# Patient Record
Sex: MALE | Race: WHITE | Employment: FULL TIME | URBAN - METROPOLITAN AREA
[De-identification: names, ages, dates, MRNs, and addresses within clinical notes are randomized per-mention and may not be internally consistent; named-entity substitution may affect disease eponyms.]

---

## 2019-07-11 ENCOUNTER — HOSPITAL ENCOUNTER (INPATIENT)
Facility: CLINIC | Age: 49
LOS: 1 days | Discharge: HOME | DRG: 394 | End: 2019-07-12
Attending: EMERGENCY MEDICINE | Admitting: INTERNAL MEDICINE
Payer: COMMERCIAL

## 2019-07-11 DIAGNOSIS — K62.5 RECTAL HEMORRHAGE: ICD-10-CM

## 2019-07-11 DIAGNOSIS — R55 SYNCOPE, UNSPECIFIED SYNCOPE TYPE: ICD-10-CM

## 2019-07-11 LAB
ABO + RH BLD: NORMAL
ABO + RH BLD: NORMAL
ANION GAP SERPL CALCULATED.3IONS-SCNC: 7 MMOL/L (ref 3–14)
APTT PPP: 24 SEC (ref 22–37)
BASOPHILS # BLD AUTO: 0 10E9/L (ref 0–0.2)
BASOPHILS NFR BLD AUTO: 0.4 %
BLD GP AB SCN SERPL QL: NORMAL
BLOOD BANK CMNT PATIENT-IMP: NORMAL
BUN SERPL-MCNC: 15 MG/DL (ref 7–30)
CALCIUM SERPL-MCNC: 8.3 MG/DL (ref 8.5–10.1)
CHLORIDE SERPL-SCNC: 109 MMOL/L (ref 94–109)
CO2 SERPL-SCNC: 26 MMOL/L (ref 20–32)
COLONOSCOPY: NORMAL
CREAT SERPL-MCNC: 1.04 MG/DL (ref 0.66–1.25)
DIFFERENTIAL METHOD BLD: ABNORMAL
EOSINOPHIL # BLD AUTO: 0 10E9/L (ref 0–0.7)
EOSINOPHIL NFR BLD AUTO: 0.4 %
ERYTHROCYTE [DISTWIDTH] IN BLOOD BY AUTOMATED COUNT: 12.2 % (ref 10–15)
GFR SERPL CREATININE-BSD FRML MDRD: 83 ML/MIN/{1.73_M2}
GLUCOSE SERPL-MCNC: 146 MG/DL (ref 70–99)
HCT VFR BLD AUTO: 39.2 % (ref 40–53)
HGB BLD-MCNC: 11.1 G/DL (ref 13.3–17.7)
HGB BLD-MCNC: 13.2 G/DL (ref 13.3–17.7)
IMM GRANULOCYTES # BLD: 0 10E9/L (ref 0–0.4)
IMM GRANULOCYTES NFR BLD: 0.1 %
INR PPP: 1.02 (ref 0.86–1.14)
INTERPRETATION ECG - MUSE: NORMAL
LYMPHOCYTES # BLD AUTO: 1.4 10E9/L (ref 0.8–5.3)
LYMPHOCYTES NFR BLD AUTO: 15 %
MCH RBC QN AUTO: 31.1 PG (ref 26.5–33)
MCHC RBC AUTO-ENTMCNC: 33.7 G/DL (ref 31.5–36.5)
MCV RBC AUTO: 92 FL (ref 78–100)
MONOCYTES # BLD AUTO: 0.7 10E9/L (ref 0–1.3)
MONOCYTES NFR BLD AUTO: 7.7 %
NEUTROPHILS # BLD AUTO: 7.3 10E9/L (ref 1.6–8.3)
NEUTROPHILS NFR BLD AUTO: 76.4 %
NRBC # BLD AUTO: 0 10*3/UL
NRBC BLD AUTO-RTO: 0 /100
PLATELET # BLD AUTO: 240 10E9/L (ref 150–450)
POTASSIUM SERPL-SCNC: 4.7 MMOL/L (ref 3.4–5.3)
RBC # BLD AUTO: 4.25 10E12/L (ref 4.4–5.9)
SODIUM SERPL-SCNC: 142 MMOL/L (ref 133–144)
SPECIMEN EXP DATE BLD: NORMAL
WBC # BLD AUTO: 9.6 10E9/L (ref 4–11)

## 2019-07-11 PROCEDURE — 0D5L8ZZ DESTRUCTION OF TRANSVERSE COLON, VIA NATURAL OR ARTIFICIAL OPENING ENDOSCOPIC: ICD-10-PCS | Performed by: INTERNAL MEDICINE

## 2019-07-11 PROCEDURE — 85025 COMPLETE CBC W/AUTO DIFF WBC: CPT | Performed by: EMERGENCY MEDICINE

## 2019-07-11 PROCEDURE — 45382 COLONOSCOPY W/CONTROL BLEED: CPT | Performed by: INTERNAL MEDICINE

## 2019-07-11 PROCEDURE — 86901 BLOOD TYPING SEROLOGIC RH(D): CPT | Performed by: EMERGENCY MEDICINE

## 2019-07-11 PROCEDURE — 96360 HYDRATION IV INFUSION INIT: CPT

## 2019-07-11 PROCEDURE — 99223 1ST HOSP IP/OBS HIGH 75: CPT | Mod: AI | Performed by: INTERNAL MEDICINE

## 2019-07-11 PROCEDURE — 36415 COLL VENOUS BLD VENIPUNCTURE: CPT | Performed by: INTERNAL MEDICINE

## 2019-07-11 PROCEDURE — 12000000 ZZH R&B MED SURG/OB

## 2019-07-11 PROCEDURE — 25800030 ZZH RX IP 258 OP 636: Performed by: INTERNAL MEDICINE

## 2019-07-11 PROCEDURE — 99285 EMERGENCY DEPT VISIT HI MDM: CPT | Mod: 25

## 2019-07-11 PROCEDURE — G0500 MOD SEDAT ENDO SERVICE >5YRS: HCPCS | Performed by: INTERNAL MEDICINE

## 2019-07-11 PROCEDURE — 99153 MOD SED SAME PHYS/QHP EA: CPT | Performed by: INTERNAL MEDICINE

## 2019-07-11 PROCEDURE — 25000128 H RX IP 250 OP 636: Performed by: EMERGENCY MEDICINE

## 2019-07-11 PROCEDURE — 93005 ELECTROCARDIOGRAM TRACING: CPT

## 2019-07-11 PROCEDURE — 85610 PROTHROMBIN TIME: CPT | Performed by: EMERGENCY MEDICINE

## 2019-07-11 PROCEDURE — 86850 RBC ANTIBODY SCREEN: CPT | Performed by: EMERGENCY MEDICINE

## 2019-07-11 PROCEDURE — 25000128 H RX IP 250 OP 636: Performed by: INTERNAL MEDICINE

## 2019-07-11 PROCEDURE — 80048 BASIC METABOLIC PNL TOTAL CA: CPT | Performed by: EMERGENCY MEDICINE

## 2019-07-11 PROCEDURE — 85018 HEMOGLOBIN: CPT | Performed by: INTERNAL MEDICINE

## 2019-07-11 PROCEDURE — 27210582 ZZH DEVICE CLIP RESOLUTION, EACH: Performed by: INTERNAL MEDICINE

## 2019-07-11 PROCEDURE — 99207 ZZC CDG-MDM COMPONENT: MEETS MODERATE - UP CODED: CPT | Performed by: INTERNAL MEDICINE

## 2019-07-11 PROCEDURE — 86900 BLOOD TYPING SEROLOGIC ABO: CPT | Performed by: EMERGENCY MEDICINE

## 2019-07-11 PROCEDURE — 85730 THROMBOPLASTIN TIME PARTIAL: CPT | Performed by: EMERGENCY MEDICINE

## 2019-07-11 RX ORDER — SODIUM CHLORIDE 9 MG/ML
INJECTION, SOLUTION INTRAVENOUS CONTINUOUS PRN
Status: COMPLETED | OUTPATIENT
Start: 2019-07-11 | End: 2019-07-11

## 2019-07-11 RX ORDER — MAGNESIUM SULFATE HEPTAHYDRATE 40 MG/ML
4 INJECTION, SOLUTION INTRAVENOUS EVERY 4 HOURS PRN
Status: DISCONTINUED | OUTPATIENT
Start: 2019-07-11 | End: 2019-07-12 | Stop reason: HOSPADM

## 2019-07-11 RX ORDER — OXYCODONE HYDROCHLORIDE 5 MG/1
5 TABLET ORAL EVERY 4 HOURS PRN
Status: DISCONTINUED | OUTPATIENT
Start: 2019-07-11 | End: 2019-07-12 | Stop reason: HOSPADM

## 2019-07-11 RX ORDER — POTASSIUM CHLORIDE 1500 MG/1
20-40 TABLET, EXTENDED RELEASE ORAL
Status: DISCONTINUED | OUTPATIENT
Start: 2019-07-11 | End: 2019-07-12 | Stop reason: HOSPADM

## 2019-07-11 RX ORDER — POTASSIUM CHLORIDE 1.5 G/1.58G
20-40 POWDER, FOR SOLUTION ORAL
Status: DISCONTINUED | OUTPATIENT
Start: 2019-07-11 | End: 2019-07-12 | Stop reason: HOSPADM

## 2019-07-11 RX ORDER — FENTANYL CITRATE 50 UG/ML
INJECTION, SOLUTION INTRAMUSCULAR; INTRAVENOUS PRN
Status: DISCONTINUED | OUTPATIENT
Start: 2019-07-11 | End: 2019-07-11 | Stop reason: HOSPADM

## 2019-07-11 RX ORDER — IBUPROFEN 200 MG
200 TABLET ORAL EVERY 4 HOURS PRN
Status: ON HOLD | COMMUNITY
End: 2019-07-12

## 2019-07-11 RX ORDER — SODIUM CHLORIDE 9 MG/ML
INJECTION, SOLUTION INTRAVENOUS CONTINUOUS
Status: DISCONTINUED | OUTPATIENT
Start: 2019-07-11 | End: 2019-07-12

## 2019-07-11 RX ORDER — NALOXONE HYDROCHLORIDE 0.4 MG/ML
.1-.4 INJECTION, SOLUTION INTRAMUSCULAR; INTRAVENOUS; SUBCUTANEOUS
Status: DISCONTINUED | OUTPATIENT
Start: 2019-07-11 | End: 2019-07-12 | Stop reason: HOSPADM

## 2019-07-11 RX ORDER — NALOXONE HYDROCHLORIDE 0.4 MG/ML
.1-.4 INJECTION, SOLUTION INTRAMUSCULAR; INTRAVENOUS; SUBCUTANEOUS
Status: DISCONTINUED | OUTPATIENT
Start: 2019-07-11 | End: 2019-07-11

## 2019-07-11 RX ORDER — POTASSIUM CL/LIDO/0.9 % NACL 10MEQ/0.1L
10 INTRAVENOUS SOLUTION, PIGGYBACK (ML) INTRAVENOUS
Status: DISCONTINUED | OUTPATIENT
Start: 2019-07-11 | End: 2019-07-12 | Stop reason: HOSPADM

## 2019-07-11 RX ORDER — CALCIUM CARBONATE 500 MG/1
1000 TABLET, CHEWABLE ORAL 4 TIMES DAILY PRN
Status: DISCONTINUED | OUTPATIENT
Start: 2019-07-11 | End: 2019-07-12 | Stop reason: HOSPADM

## 2019-07-11 RX ORDER — ONDANSETRON 4 MG/1
4 TABLET, ORALLY DISINTEGRATING ORAL EVERY 6 HOURS PRN
Status: DISCONTINUED | OUTPATIENT
Start: 2019-07-11 | End: 2019-07-12 | Stop reason: HOSPADM

## 2019-07-11 RX ORDER — LIDOCAINE 40 MG/G
CREAM TOPICAL
Status: DISCONTINUED | OUTPATIENT
Start: 2019-07-11 | End: 2019-07-12 | Stop reason: HOSPADM

## 2019-07-11 RX ORDER — POTASSIUM CHLORIDE 7.45 MG/ML
10 INJECTION INTRAVENOUS
Status: DISCONTINUED | OUTPATIENT
Start: 2019-07-11 | End: 2019-07-12 | Stop reason: HOSPADM

## 2019-07-11 RX ORDER — POTASSIUM CHLORIDE 29.8 MG/ML
20 INJECTION INTRAVENOUS
Status: DISCONTINUED | OUTPATIENT
Start: 2019-07-11 | End: 2019-07-12 | Stop reason: HOSPADM

## 2019-07-11 RX ORDER — ACETAMINOPHEN 325 MG/1
650 TABLET ORAL EVERY 4 HOURS PRN
Status: DISCONTINUED | OUTPATIENT
Start: 2019-07-11 | End: 2019-07-12 | Stop reason: HOSPADM

## 2019-07-11 RX ORDER — FLUMAZENIL 0.1 MG/ML
0.2 INJECTION, SOLUTION INTRAVENOUS
Status: DISCONTINUED | OUTPATIENT
Start: 2019-07-11 | End: 2019-07-11

## 2019-07-11 RX ORDER — ONDANSETRON 2 MG/ML
4 INJECTION INTRAMUSCULAR; INTRAVENOUS EVERY 6 HOURS PRN
Status: DISCONTINUED | OUTPATIENT
Start: 2019-07-11 | End: 2019-07-12 | Stop reason: HOSPADM

## 2019-07-11 RX ORDER — LIDOCAINE 40 MG/G
CREAM TOPICAL
Status: DISCONTINUED | OUTPATIENT
Start: 2019-07-11 | End: 2019-07-11 | Stop reason: HOSPADM

## 2019-07-11 RX ADMIN — SODIUM CHLORIDE: 9 INJECTION, SOLUTION INTRAVENOUS at 15:35

## 2019-07-11 RX ADMIN — SODIUM CHLORIDE 1000 ML: 9 INJECTION, SOLUTION INTRAVENOUS at 11:49

## 2019-07-11 ASSESSMENT — ENCOUNTER SYMPTOMS
BLOOD IN STOOL: 1
FEVER: 0

## 2019-07-11 ASSESSMENT — ACTIVITIES OF DAILY LIVING (ADL)
ADLS_ACUITY_SCORE: 15
ADLS_ACUITY_SCORE: 15

## 2019-07-11 ASSESSMENT — MIFFLIN-ST. JEOR: SCORE: 1766.18

## 2019-07-11 NOTE — H&P
[facility]    History and Physical : Hospitalist Service:        Date of Admission:  7/11/2019    Cumulative Summary:   Jose L Johnson is a 49 year old healthy male who underwent routine screening colonoscopy by Dr. Morton yesterday around 1400, underwent 5 a small and one large polyp removal.  Large polyp removal was complicated with removing of colic increases requiring cauterization and his staples.  Patient developed significant severe rectal hemorrhage with 8-10 bloody bowel movements this morning and had presyncopal event with fall in his stool while he was attending the meeting.  EMS found him lying with stable vitals but in significant amount of fresh blood pool.  Patient presented to the emergency room and has been admitted for further evaluation and management.    Assessment & Plan     Principal Problem:  Rectal bleeding (7/11/2019): Most likely post polyp removal bleeding, gastroenterology has been contacted from the emergency room and patient is planned to undergo endoscopy unit directly from the  ER.  His initial hemoglobin is a stable, his lightheadedness is improving he has been a started on IV fluids, patient is denying any history of taking blood thinners he has not used any Advil recently.  Presyncope with fall: Patient does not think that he completely lost his consciousness, most likely secondary to hypovolemic hypotension from ongoing severe lower GI bleed.  Acute blood loss Anemia, due to, Post Polyp Removal Bleed from Colonoscopy with Large Polyp Removal performed 7/10/19    -- Admit patient with telemetry for close monitoring to medical floor post colonoscopy.  --Activity as tolerated with assist , monitor for risk for Falls from lightheaedness  -- Notify MD if: systolic BP less than 90 or greater than 170, HR less than 50 or greater than 120, RR greater than 24, SPO2 less than 92%, Temp less than 95 F or greater than 101 F.  -- At this time patient is hemodynamically  stable can be admitted to general medical floor.  -- Hold NSAIDS and anticoagulants  -- NPO except Ice chips till seen by GI   -- GI consult ,  is aware and planning to take patient for colonoscopy from ER , patient last oral intake was last night   -- Monitor Hgb every 6 hours for first 24 hours, expecting his hemoglobin to drop despite cauterization due to huge amount of blood loss in short period of time   -- No starting PPI at this point due to the high probability of colon bleed rather than upper GI bleed   -- Patient has received IV fluid bolus in ER, continue patient on maintenance fluids   -- strict intake and output with close monitoring of bowel movements.  -- Zofran for Nausea.    Diet: General diet   Gandhi Catheter: not present  DVT Prophylaxis: Pneumatic Compression Devices and Ambulate every shift  Code Status: Full Code    Disposition: Expecting patient to stay more than 2 nights considering symptomatic anemia and severe lower GI hemorrhage.    The patient's care was discussed with the Patient and ER team  Team.    Itzel Lopes MD,[facility] medicine   ----------------------------------------------------------------------------------------------------------------------    Primary Care Physician   No primary care provider on file.    Chief Complaint   Rectal bleeding this morning  underwent colonoscopy and polyp removal yesterday   Presyncope and fall this morning .    History is obtained from the patient    Patient Active Problem List   Diagnosis     Rectal bleeding     Syncope, unspecified syncope type       History of Present Illness   Jose L Johnson is a 49 year old healthy  male who underwent routine screening colonoscopy by Dr. krishna yesterday around 1400.  Patient is denying any past medical history and is not taking any routine medications.  Patient was found to have 5 small polyps and one large polyp.  It seems like that there was complicated removal of large polyp  requiring cauterization and the staples.  Patient did well last night.  Patient started to notice bright red blood per rectum this morning.  Patient had 7-8 bowel bloody bowel movements this morning.  Patient was attending meeting in his son's school and went to the bathroom due to sudden urge to have bowel movement again which was again large bloody bowel movement.  It seems like he started to feel lightheaded and had a fall face forward on the floor.  According to patient he does not think that he completely lost his consciousness but on arrival of EMS , he was awake with blood pressure of 115/70.  He was lying in a significant pool of blood.  Patient was brought to the emergency room for further evaluation and management.    In the emergency room patient was a started on IV fluids, his vitals with a stable. Patient was also complained by his wife who is [place] gynecology physician.  At the time of my evaluation patient is feeling slightly better but thinks that he is a still slightly lightheaded although better as compared to before. Patient is denying any chest pain, palpitations or shortness of breath.  Patient is denying any similar GI bleed episodes in the past.  Patient is denying any headache he does have bruising at the forehead and nose due to the fall.  Patient does not take any blood thinners has not used Advil recently. Gastroenterology was also consulted from the emergency room.    Review of Systems   CONSTITUTIONAL:  Negative for fatigue and generalized weakness.  EYES: Negative for blurred vision and visual disturbance  HEENT: Negative for hoarseness and voice change  RESPIRATORY: negative for  cough with sputum, dyspnea, wheezing and chest pain  CARDIOVASCULAR: Negative for  chest pain, palpitations, orthopnea, exertional chest pressure/discomfort  GASTROINTESTINAL: Negative for abd pain, nausea , vomiting ,constipation and abdominal pain, positive for rectal bleed as per history of present  illness.  GENITOURINARY: Negative for burning /urgency and frequency.  HEMATOLOGIC/LYMPHATIC:  negative  ALLERGIC/IMMUNOLOGIC:  negative for drug reactions  ENDOCRINE:  negative for diabetic symptoms including polyuria, polydipsia and weight loss  MUSCULOSKELETAL: Negative for arthralgias  NEUROLOGICAL:  negative  BEHAVIOR/PSYCH: negative    Past Medical History    I have reviewed this patient's medical history and updated it with pertinent information if needed.   History reviewed. No pertinent past medical history.    Past Surgical History   I have reviewed this patient's surgical history and updated it with pertinent information if needed.  Past Surgical History:   Procedure Laterality Date     APPENDECTOMY       ENT SURGERY       EYE SURGERY         Prior to Admission Medications   Prior to Admission Medications   Prescriptions Last Dose Informant Patient Reported? Taking?   ibuprofen (ADVIL/MOTRIN) 200 MG tablet   Yes Yes   Sig: Take 200 mg by mouth every 4 hours as needed for mild pain      Facility-Administered Medications: None     Allergies   No Known Allergies    Social History   I have reviewed this patient's social history and updated it with pertinent information if needed. Jose L Johnson  reports that he has never smoked. He has never used smokeless tobacco. He reports that he drinks alcohol. He reports that he does not use drugs.    Family History   I have reviewed this patient's family history and updated it with pertinent information if needed.   History reviewed. No pertinent family history.    Physical Exam   Temp: 98.3  F (36.8  C) Temp src: Oral BP: 127/80 Pulse: 62   Resp: 16 SpO2: 100 % O2 Device: None (Room air)    Vital Signs with Ranges  Temp:  [98.3  F (36.8  C)] 98.3  F (36.8  C)  Pulse:  [62-72] 62  Resp:  [16] 16  BP: (117-127)/(69-82) 127/80  SpO2:  [100 %] 100 %  189 lbs 0 oz    Constitutional: Awake, alert,oriented to time, place and person , cooperative, no apparent  distress.Pleasent and cooperative .  Eyes: Conjunctiva and pupils examined and normal.  HEENT: Moist mucous membranes, normal dentition.small superficial cut at nasal bridge with dry blood , no pain on palpation of nasal bridge.  Respiratory: Clear to auscultation bilaterally, no crackles or wheezing.  Cardiovascular: Regular rate and rhythm, normal S1 and S2, and no murmur noted.  GI: Soft, non-distended, non-tender, hyperactive bowel sounds.  Lymph/Hematologic: No anterior cervical or supraclavicular adenopathy.  Skin: No rashes, no cyanosis, no edema.  Musculoskeletal: No joint swelling, erythema or tenderness.  Neurologic: Cranial nerves 2-12 intact, normal strength and sensation.  Psychiatric: Alert, oriented to person, place and time, no obvious anxiety or depression.    Data   Data reviewed today:  I personally reviewed the telemetry image(s) showing sinus tachycardia .    Recent Labs   Lab 07/11/19  1120   WBC 9.6   HGB 13.2*   MCV 92      INR 1.02      POTASSIUM 4.7   CHLORIDE 109   CO2 26   BUN 15   CR 1.04   ANIONGAP 7   DEYANIRA 8.3*   *       Imaging:  No results found for this or any previous visit (from the past 24 hour(s)).

## 2019-07-11 NOTE — PROVIDER NOTIFICATION
"Txt pg hospitalist, Dr. Medina: \"Pt arrived from Endo. Has sign & held orders to be released: makes him NPO, GI consult amongst others. Should these be released? Unsure if these were meant to be prior to Endo\"  "

## 2019-07-11 NOTE — PHARMACY-ADMISSION MEDICATION HISTORY
Admission medication history interview status for the 7/11/2019  admission is complete. See EPIC admission navigator for prior to admission medications     Medication history source reliability:Good    Actions taken by pharmacist (provider contacted, etc):None     Additional medication history information not noted on PTA med list :None    Medication reconciliation/reorder completed by provider prior to medication history? No    Time spent in this activity: 5min    Prior to Admission medications    Medication Sig Last Dose Taking? Auth Provider   ibuprofen (ADVIL/MOTRIN) 200 MG tablet Take 200 mg by mouth every 4 hours as needed for mild pain  Yes Unknown, Entered By History

## 2019-07-11 NOTE — ED TRIAGE NOTES
Colonoscopy yesterday, 5 polyps removed. Multiple bright red blood per rectum. Felt dizzy while on toilet and fell forward, hitting face on floor.

## 2019-07-11 NOTE — ED PROVIDER NOTES
"  History     Chief Complaint:  Rectal Bleeding and Fall    The history is provided by the patient and the EMS personnel.      Jose L Johnson is a 49 year old male who presents via EMS with rectal bleeding and a fall. The patient that a routine colonoscopy by Dr. Morton yesterday at around 1400 and they found 5 small polyps and one larger polyp. He states that starting this morning he began having bloody stools, 7 or 8 times. During his last bowel movement at around 1015 he was sitting on the toilet when he began feeling dizzy and fell forward, striking his head on the ground. He is not sure if he had any loss of consciousness. He denies any pain. EMS report a blood pressure of 115/70 laying down, heart rate in the 90s, and a blood sugar of 161.     The patient had a physical on 7/9 with the lab results below:  CBC:  WBC 5.7, HGB 14.1,    CMP: BUN/Creat ratio 21 high, A/G ratio 2.3 high, o/w WNL. (Creatinine 0.92)  UA: WNL  Lipid Panel: Cholesterol total 242 high, Non-HDL cholesterol 155 high, LDL cholesterol 144 high, o/w WNL    Allergies:  No known drug allergies.    Medications:    The patient is not currently taking any prescribed medications.    Past Medical History:    History reviewed.  No significant past medical history.     Past Surgical History:    History reviewed. No pertinent past surgical history.    Family History:    History reviewed. No pertinent family history.    Social History:  Patient is , to Dr Johnson from OB/GYN  PCP: No primary care provider on file.     Review of Systems   Constitutional: Negative for fever.   Gastrointestinal: Positive for blood in stool.   Neurological:        Possible LOC   All other systems reviewed and are negative.    Physical Exam   First Vitals:  Patient Vitals for the past 24 hrs:   BP Temp Temp src Pulse Resp SpO2 Height Weight   07/11/19 1117 125/82 98.3  F (36.8  C) Oral 66 16 100 % 1.854 m (6' 1\") 85.7 kg (189 lb)     Physical Exam  General: " Patient is alert and normal appearing.  HEENT: Head small abrasion noted to the bridge of his nose that is superficial bleeding controlled   Eyes: pupils equal and reactive. Conjunctiva clear   Nares: patent   Oropharynx: no lesions, uvula midline, no palatal draping, normal voice, no trismus  Neck: Supple without lymphadenopathy, no meningismus  Chest: Heart regular rate and rhythm.   Lungs: Equal clear to auscultation with no wheeze or rales  Abdomen: Soft, non tender, nondistended, normal bowel sounds  Back: No costovertebral angle tenderness, no midline C, T or L spine tenderness  Neuro: Grossly nonfocal, normal speech, strength equal bilaterally, CN 2-12 intact  Extremities: No deformities, equal radial and DP pulses. No clubbing, cyanosis.  No edema  Skin: Warm and dry with no rash.  Dried blood noted up and down the back of his legs as well as to his hands      Emergency Department Course   ECG:  @ 1148  Indication: Possible syncope  Vent. Rate 66 bpm. NV interval 152 ms. QRS duration 76 ms. QT/QTc 404/423 ms. P-R-T axis 21 -23 2.   Normal sinus rhythm. Normal ECG.     Read @ 1153 by Dr. Ledesma.    Laboratory:  CBC:  WBC 9.6, HGB 13.2 low,   BMP: Glucose 146 high, Calcium 8.3 low, o/w WNL. (Creatinine 1.04)  INR: 1.02  PTT: 24  Type and Screen: O, Rh positive, Antibody negative     Interventions:  1149: NS 1L IV    Emergency Department Course:  11:12 AM Nursing notes and vitals reviewed.  I performed an exam of the patient as documented above.     11:56 AM I consulted with Dr. Polo of [facility] regarding the patient.    12:00 PM I rechecked on and updated the patient.    Findings and plan explained to the patient who consents to admission.   12:06 PM I discussed the patient with Dr. Lopes of the hospitalist service, who will admit the patient to a medical bed for further monitoring, evaluation, and treatment.    Impression & Plan      Medical Decision Making:  Patient is a 49-year-old male  otherwise healthy who presents the emergency department with rectal bleeding.  Patient status post colonoscopy yesterday with large polyp removed from the left colon and piecemeal with a number of staples as well as cautery per patient's family member.  Patient began having bloody stools this morning which occurred 7 or 8 times.  At 1015 he had a significant bloody output following which he had syncope with prodrome.  Patient presents with dried blood noted to his legs but no ongoing rectal bleeding identified externally.  He is hemodynamically stable at this time.  He has no abdominal tenderness.  Patient's had a one-point drop of his hemoglobin from 2 days ago.  I suspect this will drop further.  Type and screen is on order.  Discussed with Dr. Polo on-call for [facility] who plans to take the patient back to colonscopy emergently to assess the area.  Patient will be admitted under the hospitalist service following his colonoscopy.  All patient's questions and concerns addressed.    Diagnosis:    ICD-10-CM    1. Rectal hemorrhage K62.5 ABO/Rh type and screen   2. Syncope, unspecified syncope type R55        Disposition:  Admitted to the hospitalist    I, Bradley Aasen, am serving as a scribe on 7/11/2019 at 11:11 AM to personally document services performed by Charito Ledesma MD based on my observations and the provider's statements to me.        Charito Ledesma MD  07/11/19 1124

## 2019-07-11 NOTE — ED NOTES
"[facility]  ED Nurse Handoff Report    ED Chief complaint: Rectal Bleeding and Fall      ED Diagnosis:   Final diagnoses:   None       Code Status: Full Code    Allergies: No Known Allergies    Activity level - Baseline/Home:  Independent  Activity Level - Current:   Independent    Patient's Preferred language: english   Needed?: No    Isolation: No  Infection: Not Applicable  Bariatric?: No    Vital Signs:   Vitals:    07/11/19 1117   BP: 125/82   Pulse: 66   Resp: 16   Temp: 98.3  F (36.8  C)   TempSrc: Oral   SpO2: 100%   Weight: 85.7 kg (189 lb)   Height: 1.854 m (6' 1\")       Cardiac Rhythm: ,        Pain level:      Is this patient confused?: No   Does this patient have a guardian?  No         If yes, is there guardianship documents in the Epic \"Code/ACP\" activity?  N/A         Guardian Notified?  N/A  [place] - Suicide Severity Rating Scale Completed?  Yes  If yes, what color did the patient score?  White    Patient Report: Initial Complaint: patient presents to ED via EMS with rectal bleeding and a fall. Patient had a colonoscopy yesterday with polyps removed. Patient states that this morning, he had gone to the bathroom ok and later this morning, It turned bright red and a lot of it. Patient  was on toilet  when he felt that he was going to faint. patient fell forward onto floor. Hit face on floor, small cut on bridge of nose. Patient denies any abdominal pain at this time. VSS, afebrile. Slightly dizzy with movement.  Tests Performed: labs  Abnormal Results:   Abnormal Labs Reviewed   CBC WITH PLATELETS DIFFERENTIAL - Abnormal; Notable for the following components:       Result Value    RBC Count 4.25 (*)     Hemoglobin 13.2 (*)     Hematocrit 39.2 (*)     All other components within normal limits   BASIC METABOLIC PANEL - Abnormal; Notable for the following components:    Glucose 146 (*)     Calcium 8.3 (*)     All other components within normal limits       Treatments " provided: IVF    Family Comments: wife at bedside    OBS brochure/video discussed/provided to patient/family: N/A              Name of person given brochure if not patient:               Relationship to patient:     ED Medications:   Medications   0.9% sodium chloride BOLUS (1,000 mLs Intravenous New Bag 7/11/19 7623)       Drips infusing?:  Yes    For the majority of the shift this patient was Green.   Interventions performed were .    Severe Sepsis OR Septic Shock Diagnosis Present: No    To be done/followed up on inpatient unit:      ED NURSE PHONE NUMBER: [phone]

## 2019-07-11 NOTE — CONSULTS
[facility] - GASTROENTEROLOGY CONSULTATION      Jose L Johnson  [address]  [place] [address]  49 year old male     Admission Date/Time: 7/11/2019  Primary Care Provider: No primary care provider on file.     We were asked to see the patient in consultation by Dr. Ledesma and Dr. Lopes for evaluation of hematochezia.    ASSESSMENT:    1. Hematochezia - suspected post-polypectomy bleed. Colonoscopy yesterday in which a large polyp removed from the distal transverse colon (with a smaller polyp) and 5 polyps removed from cecum/ascending, all potential sources of bleeding, but the larger polypectomy site is the most likely source. Hemoglobin 13, but given syncope and volume of bleeding, along with bleeding within the first 1-2 days, suspect this is a more significant bleed.    RECOMMENDATIONS:  1. Colonoscopy now  2. Monitor hemoglobin and transfuse as necessary.    Calos Polo MD   [facility]  Cell [phone]  After 5 PM, please call [phone]  ________________________________________________________________________        HPI:  Jose L Johnson is a 49 year old male who is otherwise healthy underwent a colonoscopy for colon cancer screening yesterday. He had 5 small tubular adenomas removed from his cecum and ascending colon with cold snare. There was a small tubular adenoma removed from the distal transverse colon near the large polypectomy site and a 2.5 cm tubular adenoma removed in piecemeal fashion with hot snare from the distal transverse colon, two clips placed and tattoo placed to markel the site. He did well last night, but this morning developed progressively more hematochezia with a possible syncopal event. On arrival of EMS, his blood pressure was 115/70, but he was lying in a significant pool of blood. He denies any significant abdominal pain. He denies any nausea or vomiting. His wife is a ob/gyn physician on staff at [employer] and accompanies patient currently. He denies any  "aspirin or blood thinners. Hemoglobin was 13.2 in ED. Blood pressure stable now.       ROS: A comprehensive ten point review of systems was negative aside from those in mentioned in the HPI.      PAST MEDICAL HISTORY:  History reviewed. No pertinent past medical history.   - History of appendectomy  SOCIAL HISTORY:  Social History     Tobacco Use     Smoking status: Never Smoker     Smokeless tobacco: Never Used   Substance Use Topics     Alcohol use: Yes     Comment: occas     Drug use: Never     FAMILY HISTORY:  History reviewed. No pertinent family history.  ALLERGIES: No Known Allergies  MEDICATIONS:   Prior to Admission medications    Medication Sig Start Date End Date Taking? Authorizing Provider   ibuprofen (ADVIL/MOTRIN) 200 MG tablet Take 200 mg by mouth every 4 hours as needed for mild pain   Yes Unknown, Entered By History     PHYSICAL EXAM:   /72   Pulse 60   Temp 98.3  F (36.8  C) (Oral)   Resp 15   Ht 1.854 m (6' 1\")   Wt 85.7 kg (189 lb)   SpO2 99%   BMI 24.94 kg/m     GEN: Alert, oriented x3, NAD.  LAUREN: AT, anicteric, OP without erythema, exudate, or ulcers.    NECK: Supple.    LYMPH: No LAD noted.  HRT: RRR, no NIK  LUNGS: CTA  ABD: +BS, non-tender, non-distended, no rebound or guarding.  SKIN: No rash, jaundice   NEURO: MS intact       ADDITIONAL DATA:   I reviewed the patient's new clinical lab test results.   Recent Labs   Lab Test 07/11/19  1120   WBC 9.6   HGB 13.2*   MCV 92      INR 1.02     Recent Labs   Lab Test 07/11/19  1120   POTASSIUM 4.7   CHLORIDE 109   CO2 26   BUN 15   ANIONGAP 7      No imaging results.                  "

## 2019-07-11 NOTE — ED NOTES
Bed: ED03  Expected date:   Expected time:   Means of arrival:   [place]  [facility] [place] 49 [place] bleed eta 1108

## 2019-07-12 VITALS
HEART RATE: 67 BPM | SYSTOLIC BLOOD PRESSURE: 130 MMHG | TEMPERATURE: 98 F | DIASTOLIC BLOOD PRESSURE: 80 MMHG | HEIGHT: 73 IN | OXYGEN SATURATION: 97 % | WEIGHT: 194.6 LBS | BODY MASS INDEX: 25.79 KG/M2 | RESPIRATION RATE: 16 BRPM

## 2019-07-12 LAB
ANION GAP SERPL CALCULATED.3IONS-SCNC: 4 MMOL/L (ref 3–14)
BUN SERPL-MCNC: 8 MG/DL (ref 7–30)
CALCIUM SERPL-MCNC: 7.9 MG/DL (ref 8.5–10.1)
CHLORIDE SERPL-SCNC: 111 MMOL/L (ref 94–109)
CO2 SERPL-SCNC: 27 MMOL/L (ref 20–32)
CREAT SERPL-MCNC: 0.86 MG/DL (ref 0.66–1.25)
ERYTHROCYTE [DISTWIDTH] IN BLOOD BY AUTOMATED COUNT: 12.4 % (ref 10–15)
GFR SERPL CREATININE-BSD FRML MDRD: >90 ML/MIN/{1.73_M2}
GLUCOSE SERPL-MCNC: 97 MG/DL (ref 70–99)
HCT VFR BLD AUTO: 32 % (ref 40–53)
HGB BLD-MCNC: 10.6 G/DL (ref 13.3–17.7)
HGB BLD-MCNC: 10.8 G/DL (ref 13.3–17.7)
MCH RBC QN AUTO: 31.4 PG (ref 26.5–33)
MCHC RBC AUTO-ENTMCNC: 33.8 G/DL (ref 31.5–36.5)
MCV RBC AUTO: 93 FL (ref 78–100)
PLATELET # BLD AUTO: 187 10E9/L (ref 150–450)
POTASSIUM SERPL-SCNC: 3.9 MMOL/L (ref 3.4–5.3)
RBC # BLD AUTO: 3.44 10E12/L (ref 4.4–5.9)
SODIUM SERPL-SCNC: 142 MMOL/L (ref 133–144)
WBC # BLD AUTO: 7.7 10E9/L (ref 4–11)

## 2019-07-12 PROCEDURE — 25800030 ZZH RX IP 258 OP 636: Performed by: INTERNAL MEDICINE

## 2019-07-12 PROCEDURE — 80048 BASIC METABOLIC PNL TOTAL CA: CPT | Performed by: INTERNAL MEDICINE

## 2019-07-12 PROCEDURE — 85018 HEMOGLOBIN: CPT | Performed by: INTERNAL MEDICINE

## 2019-07-12 PROCEDURE — 36415 COLL VENOUS BLD VENIPUNCTURE: CPT | Performed by: INTERNAL MEDICINE

## 2019-07-12 PROCEDURE — 99238 HOSP IP/OBS DSCHRG MGMT 30/<: CPT | Performed by: HOSPITALIST

## 2019-07-12 PROCEDURE — 85027 COMPLETE CBC AUTOMATED: CPT | Performed by: INTERNAL MEDICINE

## 2019-07-12 RX ADMIN — SODIUM CHLORIDE: 9 INJECTION, SOLUTION INTRAVENOUS at 00:32

## 2019-07-12 ASSESSMENT — MIFFLIN-ST. JEOR: SCORE: 1791.58

## 2019-07-12 ASSESSMENT — ACTIVITIES OF DAILY LIVING (ADL)
ADLS_ACUITY_SCORE: 13

## 2019-07-12 NOTE — PROGRESS NOTES
"[facility] - GASTROENTEROLOGY PROGRESS NOTE     IMPRESSION:  1. Post-polypectomy bleed - no further BMs and hgb stable at 10.6 to 10.8 on last two checks    RECOMMENDATIONS:  1. Ok to discharge today  2. If recurrent red or maroon blood in stool or if melena, then patient will return to ED  3. We discussed that he is at risk for recurrent post-polypectomy bleed for up to 14 days after his colonoscopy. He should not go to [place] next week, due to this risk.     Calos Polo  [facility]  Cell [phone]  After 5 PM, please call [phone]  ________________________________________________________________________      SUBJECTIVE:  Patient feels fine. No further bowel movements, hgb stable.        OBJECTIVE:  /80   Pulse 67   Temp 98  F (36.7  C) (Oral)   Resp 16   Ht 1.854 m (6' 1\")   Wt 88.3 kg (194 lb 9.6 oz)   SpO2 97%   BMI 25.67 kg/m    Temp (24hrs), Av  F (36.7  C), Min:97.7  F (36.5  C), Max:98.3  F (36.8  C)    Patient Vitals for the past 72 hrs:   Weight   19 0500 88.3 kg (194 lb 9.6 oz)   19 1117 85.7 kg (189 lb)        PHYSICAL EXAM  GEN: Alert, oriented x3, NAD.    HRT: reg  LUNGS: CTA  ABD: +BS, non-tender, non-distended, no rebound or guarding.    Additional Data:  I have reviewed the patient's new clinical lab results:     Recent Labs   Lab Test 19  0716 19  0020 19  1810 19  1120   WBC 7.7  --   --  9.6   HGB 10.8* 10.6* 11.1* 13.2*   MCV 93  --   --  92     --   --  240   INR  --   --   --  1.02     Recent Labs   Lab Test 19  0716 19  1120   POTASSIUM 3.9 4.7   CHLORIDE 111* 109   CO2 27 26   BUN 8 15   ANIONGAP 4 7     No lab results found.    Invalid input(s): ALKPHOSPH         "

## 2019-07-12 NOTE — PLAN OF CARE
A/OX.4 AVSS. RA. Tolerating clear liquids without nausea or abdominal pain. Up SBA, steady with no light headed or dizziness. No bloody stools. Hemoglobin stable 10.8. Patient discharged home this morning.

## 2019-07-12 NOTE — PLAN OF CARE
Pt arrived to . 33 @ 1450: VSS. A/O x 4. Lungs: clear throughout, RA. BS present, passing flatus, no BM. Voided: 200 ml w/ 1 urine occurrence. Up SBA. Diet: clear liquid (no reds), tolerating. Denies pain. IV: NS @ 125 ml/hr. Hemoglobin: 11.6 @ 1800, checks  scheduled Q 6.

## 2019-07-12 NOTE — DISCHARGE SUMMARY
[facility]  Hospitalist Discharge Summary       Date of Admission:  7/11/2019  Date of Discharge:  7/12/2019  Discharging Provider: Brijesh Mendez MD      Discharge Diagnoses   Post-polypectomy bleed with acute blood loss anemia    Follow-ups Needed After Discharge   Follow-up Appointments     Follow-up and recommended labs and tests       Follow up with primary care provider, Joesph Dodd, as needed.             Discharge Disposition   Discharged to home  Condition at discharge: Stable    Hospital Course   Jose L Johnson is a 49 year old male admitted on 7/11/2019.  He presented following multiple bloody bowel movements the day following colonoscopy with multiple polyps removed.  He underwent colonoscopy with finding of stigmata of recent bleeding at site of prior polypectomy site s/p clipping.  Hemoglobin fell from 13.2 at admission and stabilized about 11 g/dl.  He was feeling well hospital day 2 and cleared to discharge by gastroenterology.      Consultations This Hospital Stay   GASTROENTEROLOGY IP CONSULT    Code Status   Full Code    Time Spent on this Encounter   I, Brijesh Mendez, personally saw the patient today and spent less than or equal to 30 minutes discharging this patient.       Brijesh Mendez MD  [facility]  ______________________________________________________________________    Physical Exam   Vital Signs: Temp: 98  F (36.7  C) Temp src: Oral BP: 130/80 Pulse: 67 Heart Rate: 51 Resp: 16 SpO2: 97 % O2 Device: None (Room air) Oxygen Delivery: 2 LPM  Weight: 194 lbs 9.6 oz  General Appearance: Well developed, well nourished male in NAD  Respiratory: lungs CTAB, no wheezes or crackles  Cardiovascular: RRR, normal s1/s2 without murmur  GI: abdomen soft, nontender  Other: Alert and appropriate, cranial nerves grossly intact        Primary Care Physician   Joesph Dodd    Discharge Orders      Reason for your hospital stay    You were admitted for bleeding following  polyp removal.     Follow-up and recommended labs and tests     Follow up with primary care provider, Joesph Dodd, as needed.     Activity    Your activity upon discharge: activity as tolerated     Discharge Instructions    Use tylenol as needed for pain for the next two weeks (ibuprofen, naproxen, aspirin have blood thinning effects and you remain at risk for bleeding).  You may take an over the counter iron supplement once or twice daily to help restore iron levels following blood loss.     Full Code     Diet    Follow this diet upon discharge: Regular       Significant Results and Procedures   Most Recent 3 CBC's:  Recent Labs   Lab Test 07/12/19  0716 07/12/19  0020 07/11/19  1810 07/11/19  1120   WBC 7.7  --   --  9.6   HGB 10.8* 10.6* 11.1* 13.2*   MCV 93  --   --  92     --   --  240     Most Recent 3 BMP's:  Recent Labs   Lab Test 07/12/19  0716 07/11/19  1120    142   POTASSIUM 3.9 4.7   CHLORIDE 111* 109   CO2 27 26   BUN 8 15   CR 0.86 1.04   ANIONGAP 4 7   DEYANIRA 7.9* 8.3*   GLC 97 146*     Most Recent 3 Hemoglobins:  Recent Labs   Lab Test 07/12/19  0716 07/12/19  0020 07/11/19  1810   HGB 10.8* 10.6* 11.1*   , No results found for this or any previous visit.    Discharge Medications   Current Discharge Medication List      STOP taking these medications       ibuprofen (ADVIL/MOTRIN) 200 MG tablet Comments:   Reason for Stopping:             Allergies   No Known Allergies

## 2021-03-28 ENCOUNTER — HEALTH MAINTENANCE LETTER (OUTPATIENT)
Age: 51
End: 2021-03-28

## 2021-09-12 ENCOUNTER — HEALTH MAINTENANCE LETTER (OUTPATIENT)
Age: 51
End: 2021-09-12

## 2022-04-23 ENCOUNTER — HEALTH MAINTENANCE LETTER (OUTPATIENT)
Age: 52
End: 2022-04-23

## 2022-10-30 ENCOUNTER — HEALTH MAINTENANCE LETTER (OUTPATIENT)
Age: 52
End: 2022-10-30

## 2023-06-01 ENCOUNTER — HEALTH MAINTENANCE LETTER (OUTPATIENT)
Age: 53
End: 2023-06-01

## (undated) RX ORDER — FENTANYL CITRATE 50 UG/ML
INJECTION, SOLUTION INTRAMUSCULAR; INTRAVENOUS
Status: DISPENSED
Start: 2019-07-11